# Patient Record
Sex: MALE | Race: WHITE | NOT HISPANIC OR LATINO | URBAN - NONMETROPOLITAN AREA
[De-identification: names, ages, dates, MRNs, and addresses within clinical notes are randomized per-mention and may not be internally consistent; named-entity substitution may affect disease eponyms.]

---

## 2018-10-24 ENCOUNTER — IMPORTED ENCOUNTER (OUTPATIENT)
Dept: URBAN - NONMETROPOLITAN AREA CLINIC 1 | Facility: CLINIC | Age: 63
End: 2018-10-24

## 2018-10-24 PROBLEM — H52.4: Noted: 2018-10-24

## 2018-10-24 PROBLEM — H25.13: Noted: 2018-10-24

## 2018-10-24 PROBLEM — H01.021: Noted: 2018-10-24

## 2018-10-24 PROBLEM — H01.024: Noted: 2018-10-24

## 2018-10-24 PROCEDURE — S0620 ROUTINE OPHTHALMOLOGICAL EXA: HCPCS

## 2018-10-24 NOTE — PATIENT DISCUSSION
Astigmatism / Hyperopia / Presbyopia OU- Discussed diagnosis in detail with patient - New glasses Rx given today- Optosdonetoday Baseline WNL OU - Continue to monitor- RTC 1 year complete Blepharitis OU - Discussed diagnosis in detail with patient- Recommend patient using [employer] baby shampoo to scrub lid daily- Continue to [place] OU( Lens Changes)- Discussed diagnosis in detail with patient- Discussed signs and symptoms of progression- Discussed UV protection- No treatment needed at this time - Continue to monitor; 's Notes: MR 10/24/18Optos 10/24/18DFE DEFER 10/24/18

## 2022-02-12 ASSESSMENT — VISUAL ACUITY
OS_SC: 20/20-
OS_GLARE: 20/30
OD_GLARE: 20/30
OD_SC: 20/20-

## 2022-02-12 ASSESSMENT — TONOMETRY
OS_IOP_MMHG: 14
OD_IOP_MMHG: 15

## 2022-02-14 ENCOUNTER — COMPREHENSIVE EXAM (OUTPATIENT)
Dept: URBAN - NONMETROPOLITAN AREA CLINIC 1 | Facility: CLINIC | Age: 67
End: 2022-02-14

## 2022-02-14 DIAGNOSIS — H52.4: ICD-10-CM

## 2022-02-14 DIAGNOSIS — H25.813: ICD-10-CM

## 2022-02-14 PROCEDURE — 92015 DETERMINE REFRACTIVE STATE: CPT

## 2022-02-14 PROCEDURE — 92012 INTRM OPH EXAM EST PATIENT: CPT

## 2022-02-14 ASSESSMENT — TONOMETRY
OD_IOP_MMHG: 14
OS_IOP_MMHG: 14

## 2022-02-14 ASSESSMENT — VISUAL ACUITY
OD_CC: 20/20-2
OS_CC: 20/20-1

## 2022-02-14 NOTE — PATIENT DISCUSSION
Discussed diagnosis in detail with patient. Discussed signs and symptoms of progression. Discussed UV protection. No treatment needed at this time. Continue to monitor.

## 2022-02-14 NOTE — PATIENT DISCUSSION
Astigmatism / Hyperopia / Presbyopia OU- Discussed diagnosis in detail with patient - New glasses Rx given today- Optosdonetoday Baseline WNL OU - Continue to monitor- RTC 1 year complete Blepharitis OU - Discussed diagnosis in detail with patient- Recommend patient using [employer] J baby shampoo to scrub lid daily- Continue to [place] OU( Lens Changes)- Discussed diagnosis in detail with patient- Discussed signs and symptoms of progression- Discussed UV protection- No treatment needed at this time - Continue to monitor; 's Notes: MR 10/24/18Optos 10/24/18DFE DEFER 10/24/18.

## 2024-08-29 ENCOUNTER — FOLLOW UP (OUTPATIENT)
Dept: URBAN - NONMETROPOLITAN AREA CLINIC 1 | Facility: CLINIC | Age: 69
End: 2024-08-29

## 2024-08-29 DIAGNOSIS — H25.813: ICD-10-CM

## 2024-08-29 DIAGNOSIS — H52.4: ICD-10-CM

## 2024-08-29 PROCEDURE — 92015 DETERMINE REFRACTIVE STATE: CPT

## 2024-08-29 PROCEDURE — 99213 OFFICE O/P EST LOW 20 MIN: CPT

## 2024-08-29 ASSESSMENT — TONOMETRY
OS_IOP_MMHG: 14
OD_IOP_MMHG: 14

## 2024-08-29 ASSESSMENT — VISUAL ACUITY
OD_SC: 20/30-2
OD_CC: 20/40-1
OS_SC: 20/20-2
OS_CC: 20/25